# Patient Record
Sex: MALE | HISPANIC OR LATINO | Employment: PART TIME | URBAN - METROPOLITAN AREA
[De-identification: names, ages, dates, MRNs, and addresses within clinical notes are randomized per-mention and may not be internally consistent; named-entity substitution may affect disease eponyms.]

---

## 2023-12-01 ENCOUNTER — OFFICE VISIT (OUTPATIENT)
Dept: URGENT CARE | Facility: CLINIC | Age: 23
End: 2023-12-01
Payer: COMMERCIAL

## 2023-12-01 VITALS
BODY MASS INDEX: 26.66 KG/M2 | RESPIRATION RATE: 18 BRPM | DIASTOLIC BLOOD PRESSURE: 78 MMHG | SYSTOLIC BLOOD PRESSURE: 128 MMHG | OXYGEN SATURATION: 97 % | HEART RATE: 94 BPM | WEIGHT: 180 LBS | HEIGHT: 69 IN | TEMPERATURE: 98.9 F

## 2023-12-01 DIAGNOSIS — R06.6 HICCUPS: ICD-10-CM

## 2023-12-01 DIAGNOSIS — R11.10 VOMITING, UNSPECIFIED VOMITING TYPE, UNSPECIFIED WHETHER NAUSEA PRESENT: ICD-10-CM

## 2023-12-01 PROCEDURE — 3074F SYST BP LT 130 MM HG: CPT | Performed by: NURSE PRACTITIONER

## 2023-12-01 PROCEDURE — 99203 OFFICE O/P NEW LOW 30 MIN: CPT | Performed by: NURSE PRACTITIONER

## 2023-12-01 PROCEDURE — 3078F DIAST BP <80 MM HG: CPT | Performed by: NURSE PRACTITIONER

## 2023-12-01 RX ORDER — HYDROXYZINE HYDROCHLORIDE 10 MG/1
10 TABLET, FILM COATED ORAL 3 TIMES DAILY PRN
COMMUNITY
Start: 2023-11-29

## 2023-12-01 RX ORDER — OMEPRAZOLE 20 MG/1
20 CAPSULE, DELAYED RELEASE ORAL DAILY
Qty: 30 CAPSULE | Refills: 0 | Status: SHIPPED | OUTPATIENT
Start: 2023-12-01

## 2023-12-01 RX ORDER — SERTRALINE HYDROCHLORIDE 100 MG/1
1 TABLET, FILM COATED ORAL
COMMUNITY
End: 2023-12-01

## 2023-12-01 RX ORDER — BACLOFEN 5 MG/1
5-10 TABLET ORAL 3 TIMES DAILY
Qty: 18 TABLET | Refills: 0 | Status: SHIPPED | OUTPATIENT
Start: 2023-12-01 | End: 2023-12-04

## 2023-12-01 RX ORDER — ONDANSETRON 4 MG/1
4 TABLET, ORALLY DISINTEGRATING ORAL ONCE
Status: COMPLETED | OUTPATIENT
Start: 2023-12-01 | End: 2023-12-01

## 2023-12-01 RX ORDER — TRAZODONE HYDROCHLORIDE 100 MG/1
TABLET ORAL
COMMUNITY

## 2023-12-01 RX ORDER — ARIPIPRAZOLE 5 MG/1
5 TABLET ORAL DAILY
COMMUNITY

## 2023-12-01 RX ADMIN — ONDANSETRON 4 MG: 4 TABLET, ORALLY DISINTEGRATING ORAL at 17:50

## 2023-12-01 ASSESSMENT — ENCOUNTER SYMPTOMS
VOMITING: 0
HEARTBURN: 0
COUGH: 0
FEVER: 0
HEADACHES: 1
NAUSEA: 1
DIZZINESS: 0
SORE THROAT: 0

## 2023-12-01 ASSESSMENT — FIBROSIS 4 INDEX: FIB4 SCORE: 0.41

## 2023-12-02 NOTE — PROGRESS NOTES
Subjective:     Manolo Mosley is a 23 y.o. male who presents for Hiccups (X 2 days, painful hiccups, pain when swallowing.)      Hiccups x since wednesday. Denies GERD. Denies taking any new medications, including steroids. Reports development of epigastric pain with hiccoughs. Esophageal discomfort. Headache started the second day.Has tried Pepto Bismol. States he has one other occurrence of prolonged hiccough which resolved after a day. Reports trying several different methods without relief, including holding breath and drinking water.         History reviewed. No pertinent past medical history.    History reviewed. No pertinent surgical history.    Social History     Socioeconomic History    Marital status: Single     Spouse name: Not on file    Number of children: Not on file    Years of education: Not on file    Highest education level: Not on file   Occupational History    Not on file   Tobacco Use    Smoking status: Never    Smokeless tobacco: Never   Vaping Use    Vaping Use: Never used   Substance and Sexual Activity    Alcohol use: Not on file    Drug use: Not on file    Sexual activity: Not on file   Other Topics Concern    Not on file   Social History Narrative    Not on file     Social Determinants of Health     Financial Resource Strain: Not on file   Food Insecurity: Not on file   Transportation Needs: Not on file   Physical Activity: Not on file   Stress: Not on file   Social Connections: Not on file   Intimate Partner Violence: Not on file   Housing Stability: Not on file        History reviewed. No pertinent family history.     No Known Allergies    Review of Systems   Constitutional:  Negative for fever.   HENT:  Negative for sore throat.    Respiratory:  Negative for cough and shortness of breath.    Cardiovascular:  Negative for palpitations.   Gastrointestinal:  Positive for nausea. Negative for heartburn and vomiting.   Neurological:  Positive for headaches. Negative for  "dizziness.   All other systems reviewed and are negative.       Objective:   /78   Pulse 94   Temp 37.2 °C (98.9 °F) (Temporal)   Resp 18   Ht 1.753 m (5' 9\")   Wt 81.6 kg (180 lb)   SpO2 97%   BMI 26.58 kg/m²     Physical Exam  Vitals reviewed.   Constitutional:       General: He is not in acute distress.     Appearance: He is well-developed. He is not toxic-appearing.   HENT:      Head: Normocephalic and atraumatic.      Right Ear: External ear normal.      Left Ear: External ear normal.      Nose: Nose normal.      Mouth/Throat:      Mouth: Mucous membranes are moist.      Pharynx: Oropharynx is clear.   Eyes:      Conjunctiva/sclera: Conjunctivae normal.   Cardiovascular:      Rate and Rhythm: Normal rate and regular rhythm.   Pulmonary:      Effort: Pulmonary effort is normal. No respiratory distress.      Breath sounds: Normal breath sounds.   Abdominal:      General: There is no distension.      Palpations: Abdomen is soft.      Tenderness: There is abdominal tenderness. There is no guarding.      Comments: Persistent hiccoughs noted. Mild epigastric TTP.    Musculoskeletal:         General: Normal range of motion.      Cervical back: Neck supple. No edema or crepitus.   Skin:     General: Skin is warm and dry.      Findings: No rash.   Neurological:      Mental Status: He is alert and oriented to person, place, and time.      GCS: GCS eye subscore is 4. GCS verbal subscore is 5. GCS motor subscore is 6.   Psychiatric:         Speech: Speech normal.         Behavior: Behavior normal.         Thought Content: Thought content normal.         Judgment: Judgment normal.         Assessment/Plan:   1. Hiccups  - omeprazole (PRILOSEC) 20 MG delayed-release capsule; Take 1 Capsule by mouth every day.  Dispense: 30 Capsule; Refill: 0  - baclofen (LIORESAL) 5 MG Tab; Take 1-2 Tablets by mouth 3 times a day for 3 days.  Dispense: 18 Tablet; Refill: 0    2. Vomiting, unspecified vomiting type, unspecified " whether nausea present  - ondansetron (Zofran ODT) dispertab 4 mg    Follow up with PCP.     -Follow up for persistent or worsening symptoms, including palpitations, chest pain, dizziness, shortness of breath, spitting up or coughing up blood, coffee ground emesis(vomit).    Performed the following maneuvers:  -Holding breath for 5 to 10 seconds  -Pulled and held on to the tongue.  -Pressing gently but firmly on the eyeballs.  -Attempted performing Valsalva maneuver, having the patient blow in to a 10 cc syringe.     -Patient experienced nausea and vomiting with the valsalva maneuver. Medicated with zofran, and had him lay down.    Presents with acute hiccoughs. Unable to resolve hiccough with the above attempts in office. Advised trialing a PPI and baclofen, with f/u for further evaluation if symptoms persist. The patient takes trazodone PRN. Discussed sedative effects of baclofen, and advised to not take trazodone with this medication.     Differential diagnosis, natural history, supportive care, and indications for immediate follow-up discussed.

## 2023-12-05 ASSESSMENT — ENCOUNTER SYMPTOMS
PALPITATIONS: 0
SHORTNESS OF BREATH: 0

## 2023-12-06 NOTE — PATIENT INSTRUCTIONS
Follow up with PCP.     -Follow up for persistent or worsening symptoms, including palpitations, chest pain, dizziness, shortness of breath, spitting up or coughing up blood, coffee ground emesis(vomit).